# Patient Record
Sex: FEMALE | Race: WHITE | NOT HISPANIC OR LATINO | URBAN - METROPOLITAN AREA
[De-identification: names, ages, dates, MRNs, and addresses within clinical notes are randomized per-mention and may not be internally consistent; named-entity substitution may affect disease eponyms.]

---

## 2023-03-22 ENCOUNTER — OFFICE (OUTPATIENT)
Dept: URBAN - METROPOLITAN AREA CLINIC 16 | Facility: CLINIC | Age: 33
End: 2023-03-22

## 2023-03-22 VITALS
OXYGEN SATURATION: 99 % | HEIGHT: 69 IN | WEIGHT: 180 LBS | HEART RATE: 75 BPM | SYSTOLIC BLOOD PRESSURE: 112 MMHG | DIASTOLIC BLOOD PRESSURE: 68 MMHG

## 2023-03-22 DIAGNOSIS — K21.00 GASTRO-ESOPHAGEAL REFLUX DISEASE WITH ESOPHAGITIS, WITHOUT B: ICD-10-CM

## 2023-03-22 DIAGNOSIS — R19.7 DIARRHEA, UNSPECIFIED: ICD-10-CM

## 2023-03-22 DIAGNOSIS — K20.0 EOSINOPHILIC ESOPHAGITIS: ICD-10-CM

## 2023-03-22 DIAGNOSIS — K62.5 HEMORRHAGE OF ANUS AND RECTUM: ICD-10-CM

## 2023-03-22 DIAGNOSIS — Z83.79 FAMILY HISTORY OF OTHER DISEASES OF THE DIGESTIVE SYSTEM: ICD-10-CM

## 2023-03-22 DIAGNOSIS — R14.0 ABDOMINAL DISTENSION (GASEOUS): ICD-10-CM

## 2023-03-22 DIAGNOSIS — R19.4 CHANGE IN BOWEL HABIT: ICD-10-CM

## 2023-03-22 LAB
C-REACTIVE PROTEIN: <0.3 MG/DL
CBC, PLATELET CT  AND  DIFF: ABS BASOPHIL: 0.1 K/UL
CBC, PLATELET CT  AND  DIFF: ABS EOSINOPHIL: 0.2 K/UL
CBC, PLATELET CT  AND  DIFF: ABS IMMATURE GRANS: 0 K/UL
CBC, PLATELET CT  AND  DIFF: ABS LYMPHOCYTE: 1.5 K/UL
CBC, PLATELET CT  AND  DIFF: ABS MONOCYTE: 0.5 K/UL
CBC, PLATELET CT  AND  DIFF: ABS NEUTROPHIL: 2.3 K/UL
CBC, PLATELET CT  AND  DIFF: BASOPHIL: 1.1 %
CBC, PLATELET CT  AND  DIFF: DIFFERENTIAL: (no result)
CBC, PLATELET CT  AND  DIFF: EOSINOPHIL: 4 %
CBC, PLATELET CT  AND  DIFF: HEMATOCRIT: 38.6 %
CBC, PLATELET CT  AND  DIFF: HEMOGLOBIN: 13.5 G/DL
CBC, PLATELET CT  AND  DIFF: IMMATURE GRANULOCYTES: 0.2 %
CBC, PLATELET CT  AND  DIFF: LYMPHOCYTE: 33.6 %
CBC, PLATELET CT  AND  DIFF: MCH: 31.9 PG
CBC, PLATELET CT  AND  DIFF: MCHC: 35 G/DL
CBC, PLATELET CT  AND  DIFF: MCV: 91.3 FL
CBC, PLATELET CT  AND  DIFF: MONOCYTE: 11 %
CBC, PLATELET CT  AND  DIFF: MPV: 10.6 FL
CBC, PLATELET CT  AND  DIFF: NEUTROPHIL: 50.1 %
CBC, PLATELET CT  AND  DIFF: NRBCS: 0 /100 WBC
CBC, PLATELET CT  AND  DIFF: PLATELET COUNT: 248 K/UL
CBC, PLATELET CT  AND  DIFF: RBC: 4.23 M/UL
CBC, PLATELET CT  AND  DIFF: RDW: 12.5 %
CBC, PLATELET CT  AND  DIFF: WBC COUNT: 4.6 K/UL
HEPATIC FUNCTION PANEL: A/G RATIO: 1.8 RATIO
HEPATIC FUNCTION PANEL: ALBUMIN: 4.8 G/DL
HEPATIC FUNCTION PANEL: ALK PHOSPHATASE: 47 U/L
HEPATIC FUNCTION PANEL: ALT: 16 U/L
HEPATIC FUNCTION PANEL: AST: 19 U/L
HEPATIC FUNCTION PANEL: BILIRUBIN, INDIRECT: (no result) MG/DL
HEPATIC FUNCTION PANEL: BILIRUBIN,DIRECT: <0.2 MG/DL
HEPATIC FUNCTION PANEL: BILIRUBIN,TOTAL: 0.4 MG/DL
HEPATIC FUNCTION PANEL: GLOBULIN: 2.6 G/DL
HEPATIC FUNCTION PANEL: TOTAL PROTEIN: 7.4 G/DL
LIPASE: 37 U/L

## 2023-03-22 PROCEDURE — 99204 OFFICE O/P NEW MOD 45 MIN: CPT | Performed by: INTERNAL MEDICINE

## 2023-03-22 NOTE — SERVICENOTES
She will have fiber supplement such as Citrucel daily, lactose-free and a probiotic trial such as align and we have sent in dicyclomine for now

## 2023-03-22 NOTE — SERVICEHPINOTES
Milana Was seen by my group many years ago and seen in 2013 and last seen in 2015. At that time she was having some issues with reflux and having issues on a fairly regular basis. She did have an EGD at the hospital in December 2015 with concern for hiatal hernia that was small and some furrowing of the esophagus with some circumferential scarring raising the possibility of EOE biopsies were consistent with EOE at that time he was having some chest pain at that timeJosep is doing well from an upper GI standpoint now but has issues with a change in bowels and for the past week or so has had loose bowels and even some blood with the stool etc. She does not have a ist degree family history of Crohn's or colitis or history of polyps in the familyJosep had a cousin however with Crohn's